# Patient Record
Sex: FEMALE | Race: WHITE | Employment: OTHER | URBAN - METROPOLITAN AREA
[De-identification: names, ages, dates, MRNs, and addresses within clinical notes are randomized per-mention and may not be internally consistent; named-entity substitution may affect disease eponyms.]

---

## 2018-01-15 PROBLEM — M75.41 IMPINGEMENT SYNDROME OF RIGHT SHOULDER: Status: ACTIVE | Noted: 2018-01-15

## 2021-10-28 ENCOUNTER — HOSPITAL ENCOUNTER (EMERGENCY)
Age: 71
Discharge: HOME | End: 2021-10-28
Attending: EMERGENCY MEDICINE
Payer: MEDICARE

## 2021-10-28 ENCOUNTER — APPOINTMENT (OUTPATIENT)
Dept: GENERAL RADIOLOGY | Age: 71
End: 2021-10-28
Payer: MEDICARE

## 2021-10-28 ENCOUNTER — APPOINTMENT (OUTPATIENT)
Dept: CT IMAGING | Age: 71
End: 2021-10-28
Payer: MEDICARE

## 2021-10-28 VITALS
DIASTOLIC BLOOD PRESSURE: 88 MMHG | TEMPERATURE: 97 F | HEART RATE: 89 BPM | BODY MASS INDEX: 23.55 KG/M2 | RESPIRATION RATE: 14 BRPM | HEIGHT: 62 IN | SYSTOLIC BLOOD PRESSURE: 183 MMHG | WEIGHT: 128 LBS | OXYGEN SATURATION: 98 %
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
## 2021-10-28 DIAGNOSIS — S43.421A SPRAIN OF RIGHT ROTATOR CUFF CAPSULE, INITIAL ENCOUNTER: ICD-10-CM

## 2021-10-28 DIAGNOSIS — S80.11XA CONTUSION OF RIGHT LOWER LEG, INITIAL ENCOUNTER: ICD-10-CM

## 2021-10-28 DIAGNOSIS — S43.101A AC SEPARATION, RIGHT, INITIAL ENCOUNTER: ICD-10-CM

## 2021-10-28 DIAGNOSIS — S93.401A SPRAIN OF RIGHT ANKLE, UNSPECIFIED LIGAMENT, INITIAL ENCOUNTER: ICD-10-CM

## 2021-10-28 LAB
EKG ATRIAL RATE: 80 BPM
EKG P AXIS: 74 DEGREES
EKG P-R INTERVAL: 138 MS
EKG Q-T INTERVAL: 384 MS
EKG QRS DURATION: 84 MS
EKG QTC CALCULATION (BAZETT): 442 MS
EKG R AXIS: -22 DEGREES
EKG T AXIS: 66 DEGREES
EKG VENTRICULAR RATE: 80 BPM

## 2021-10-28 PROCEDURE — 72125 CT NECK SPINE W/O DYE: CPT

## 2021-10-28 PROCEDURE — 73590 X-RAY EXAM OF LOWER LEG: CPT

## 2021-10-28 PROCEDURE — 73030 X-RAY EXAM OF SHOULDER: CPT

## 2021-10-28 PROCEDURE — 6370000000 HC RX 637 (ALT 250 FOR IP): Performed by: EMERGENCY MEDICINE

## 2021-10-28 PROCEDURE — 70450 CT HEAD/BRAIN W/O DYE: CPT

## 2021-10-28 PROCEDURE — 99282 EMERGENCY DEPT VISIT SF MDM: CPT

## 2021-10-28 PROCEDURE — 93010 ELECTROCARDIOGRAM REPORT: CPT | Performed by: INTERNAL MEDICINE

## 2021-10-28 PROCEDURE — 71046 X-RAY EXAM CHEST 2 VIEWS: CPT

## 2021-10-28 PROCEDURE — 93005 ELECTROCARDIOGRAM TRACING: CPT | Performed by: PHYSICIAN ASSISTANT

## 2021-10-28 RX ORDER — OXYCODONE HYDROCHLORIDE AND ACETAMINOPHEN 5; 325 MG/1; MG/1
1 TABLET ORAL ONCE
Status: COMPLETED | OUTPATIENT
Start: 2021-10-28 | End: 2021-10-28

## 2021-10-28 RX ORDER — OXYCODONE HYDROCHLORIDE AND ACETAMINOPHEN 5; 325 MG/1; MG/1
1 TABLET ORAL EVERY 6 HOURS PRN
Qty: 12 TABLET | Refills: 0 | Status: SHIPPED | OUTPATIENT
Start: 2021-10-28 | End: 2021-10-31

## 2021-10-28 RX ADMIN — OXYCODONE AND ACETAMINOPHEN 1 TABLET: 5; 325 TABLET ORAL at 18:03

## 2021-10-28 ASSESSMENT — ENCOUNTER SYMPTOMS
COUGH: 0
BLOOD IN STOOL: 0
ABDOMINAL PAIN: 0
BACK PAIN: 0
TROUBLE SWALLOWING: 0
RHINORRHEA: 0
NAUSEA: 0
VOMITING: 0
CHEST TIGHTNESS: 0
DIARRHEA: 0
WHEEZING: 0
SHORTNESS OF BREATH: 0
EYE PAIN: 0

## 2021-10-28 ASSESSMENT — PAIN SCALES - GENERAL
PAINLEVEL_OUTOF10: 8
PAINLEVEL_OUTOF10: 8
PAINLEVEL_OUTOF10: 2

## 2021-10-28 ASSESSMENT — PAIN DESCRIPTION - ORIENTATION: ORIENTATION: RIGHT

## 2021-10-28 ASSESSMENT — PAIN DESCRIPTION - PAIN TYPE: TYPE: ACUTE PAIN

## 2021-10-28 ASSESSMENT — PAIN DESCRIPTION - LOCATION: LOCATION: SHOULDER;LEG

## 2021-10-28 NOTE — ED NOTES
Bed: 24  Expected date:   Expected time:   Means of arrival:   Comments:  triage     Iman Arriaga RN  10/28/21 4865

## 2021-10-28 NOTE — ED NOTES
FIRST PROVIDER CONTACT ASSESSMENT NOTE      Department of Emergency Medicine   10/28/21  10:55 AM EDT    Chief Complaint: Dizziness (became dizzy feel onto rt  shoulder and rt leg. Denies head injury )      History of Present Illness:   Meme Huerta is a 80 y.o. female who presents to the ED for dizziness and right shoulder and right leg pain. States that yesterday she was walking when she got dizzy and fell on her right shoulder. States that she had a cabinet. Denies hitting her head, loss of consciousness, or blood thinner use. States that she is having some right shoulder pain, neck pain, and right lower leg pain. Denies chest pain, shortness of breath, vision changes, weakness, or other focal neuro symptoms. Medical History:  has a past medical history of Fatigue and Fibromyalgia. Surgical History:  has no past surgical history on file. Social History:  reports that she has never smoked. She has never used smokeless tobacco. She reports current alcohol use of about 1.0 standard drinks of alcohol per week. She reports that she does not use drugs. Family History: family history is not on file. *ALLERGIES*     Patient has no known allergies. Physical Exam:      VS:  BP (!) 183/88   Pulse 89   Temp 97 °F (36.1 °C) (Temporal)   Resp 14   Ht 5' 2\" (1.575 m)   Wt 128 lb (58.1 kg)   LMP  (LMP Unknown)   SpO2 98%   BMI 23.41 kg/m²      Initial Plan of Care:  Initiate Treatment-Testing, Proceed toTreatment Area When Bed Available for ED Attending/MLP to Continue Care    Radial pulse 2+  DP pulse 2+  Tenderness to palpation to the right shoulder and right distal tib-fib  No ankle or foot pain to the right side  No chest TTP  A&O x 3  No focal neuro sxs    Patient told to let us know for any new or worsening symptoms while she waits in the waiting room.   She is with her .    -----------------END OF FIRST PROVIDER CONTACT ASSESSMENT NOTE--------------  Electronically signed by Carlita Sparks FOZIA BRAUN   DD: 10/28/21             Doron Abdul PA-C  10/28/21 1056

## 2021-10-28 NOTE — ED PROVIDER NOTES
[facility]  eMERGENCY dEPARTMENT eNCOUnter      Pt Name: Lata Blood  MRN: [id number]  [place] 1950  Date of evaluation: 10/28/2021  Provider: Carmelo Bose MD     [address] [place] [address]       Chief Complaint   Patient presents with    Dizziness     became dizzy feel onto rt  shoulder and rt leg. Denies head injury          HISTORY OF PRESENT ILLNESS   (Location/Symptom, Timing/Onset,Context/Setting, Quality, Duration, Modifying Factors, Severity) Note limiting factors. Patient presents here with pain to her right shoulder and ankle. She has been having problems with her sinuses which causes intermittent dizziness. Patient states that she was walking down into the basement to get something and when she turned she lost her balance and fell injuring her right shoulder and hitting her leg against the side of a door. It was hurting her all night she was really not getting any better so was brought here into the emergency department. She denies any other complaints. No headache. She only only complains of pain in her right tibia and in her right shoulder. She really did not get dizzy when she fell but she lost her balance when she turned. She has not taken any medication. Lata Blood is a 80 y.o. female who presents to the emergency department      Nursing Notes were reviewed. REVIEW OF SYSTEMS    (2+ for4; 10+ for level 5)     Review of Systems   Constitutional: Negative for appetite change, chills, fatigue, fever and unexpected weight change. HENT: Negative for congestion, drooling, nosebleeds, rhinorrhea and trouble swallowing. Eyes: Negative for pain and visual disturbance. Respiratory: Negative for cough, chest tightness, shortness of breath and wheezing. Cardiovascular: Negative for chest pain, palpitations and leg swelling.    Gastrointestinal: Negative for abdominal pain, blood in stool, diarrhea, nausea and vomiting. Endocrine: Negative for polydipsia and polyuria. Genitourinary: Negative for difficulty urinating, dysuria, flank pain, frequency, hematuria and urgency. Musculoskeletal: Positive for arthralgias. Negative for back pain, myalgias and neck pain. Skin: Negative for pallor and rash. Allergic/Immunologic: Negative for environmental allergies. Neurological: Negative for dizziness, syncope, speech difficulty, weakness, light-headedness, numbness and headaches. Hematological: Does not bruise/bleed easily. Psychiatric/Behavioral: Negative for confusion and decreased concentration. All other systems reviewed and are negative. PAST MEDICAL HISTORY     Past Medical History:   Diagnosis Date    Fatigue     Fibromyalgia        SURGICALHISTORY     No past surgical history on file. CURRENT MEDICATIONS       Previous Medications    AMMONIUM LACTATE (LAC-HYDRIN) 12 % LOTION    Apply topically daily. DULOXETINE (CYMBALTA) 60 MG EXTENDED RELEASE CAPSULE    Take 1 capsule by mouth 2 times daily    HANDICAP PLACARD MISC    by Does not apply route Duration 5 years    HYDROCORTISONE 2.5 % CREAM    APPLY TOPICALLY TWICE DAILY    MONTELUKAST (SINGULAIR) 10 MG TABLET    Take 1 tablet by mouth daily            Patient has no known allergies. FAMILY HISTORY     No family history on file. SOCIAL HISTORY       Social History     Socioeconomic History    Marital status:      Spouse name: Not on file    Number of children: Not on file    Years of education: Not on file    Highest education level: Not on file   Occupational History    Not on file   Tobacco Use    Smoking status: Never Smoker    Smokeless tobacco: Never Used   Substance and Sexual Activity    Alcohol use:  Yes     Alcohol/week: 1.0 standard drinks     Types: 1 Glasses of wine per week     Comment: .5 galss wine daily    Drug use: No    Sexual activity: Not on file   Other Topics Concern    Not on file   Social History Narrative    Not on file     Social Determinants of Health     Financial Resource Strain:     Difficulty of Paying Living Expenses:    Food Insecurity:     Worried About Running Out of Food in the Last Year:     [address] in the Last Year:    Transportation Needs:     Lack of Transportation (Medical):  Lack of Transportation (Non-Medical):    Physical Activity:     Days of Exercise per Week:     Minutes of Exercise per Session:    Stress:     Feeling of Stress :    Social Connections:     Frequency of Communication with Friends and Family:     Frequency of Social Gatherings with Friends and Family:     Attends Temple Services:     Active Member of Clubs or Organizations:     Attends Club or Organization Meetings:     Marital Status:    Intimate Partner Violence:     Fear of Current or Ex-Partner:     Emotionally Abused:     Physically Abused:     Sexually Abused:        SCREENINGS      @FLOW([id number])@    PHYSICAL EXAM    (5+ for level 4, 8+ for level 5)     ED Triage Vitals [10/28/21 1037]   BP Temp Temp Source Pulse Resp SpO2 Height Weight   (!) 183/88 97 °F (36.1 °C) Temporal 89 14 98 % 5' 2\" (1.575 m) 128 lb (58.1 kg)       Physical Exam  Vitals and nursing note reviewed. Constitutional:       General: She is not in acute distress. Appearance: Normal appearance. She is well-developed. She is not ill-appearing, toxic-appearing or diaphoretic. HENT:      Head: Normocephalic and atraumatic. Nose: Nose normal. No congestion or rhinorrhea. Eyes:      General: No scleral icterus. Right eye: No discharge. Left eye: No discharge. Conjunctiva/sclera: Conjunctivae normal.      Pupils: Pupils are equal, round, and reactive to light. Neck:      Thyroid: No thyromegaly. Vascular: No JVD. Trachea: No tracheal deviation.       Comments: Patient was placed in a c-collar on arrival.  After her CT was obtained c-collar was removed and she was put through range of motion with no pain. Cardiovascular:      Rate and Rhythm: Normal rate and regular rhythm. Heart sounds: Normal heart sounds. No murmur heard. No gallop. Pulmonary:      Effort: Pulmonary effort is normal. No respiratory distress. Breath sounds: Normal breath sounds. No stridor. No wheezing or rales. Chest:      Chest wall: No tenderness. Abdominal:      General: Abdomen is flat. There is no distension. Palpations: Abdomen is soft. There is no mass. Tenderness: There is no abdominal tenderness. There is no guarding or rebound. Musculoskeletal:         General: Tenderness and signs of injury present. No deformity. Cervical back: Normal range of motion and neck supple. No rigidity or tenderness. Comments: Patient is awake and alert. She is in a c-collar. Her neck is supple and now nontender. She has pain to palpation over the right [facility] [place] joint there is some swelling. She has decreased range of motion of the shoulder but there is no swelling or deformity. She has a hematoma to the right anterior tibia. She also has some mild swelling to the right lateral malleolus. No discrete bony tenderness. No medial tenderness. There is no deformities or signs of obvious fracture. The remainder of her extremities have good range of motion   Skin:     General: Skin is warm and dry. Coloration: Skin is not pale. Findings: No erythema or rash. Neurological:      General: No focal deficit present. Mental Status: She is alert and oriented to person, place, and time. Cranial Nerves: No cranial nerve deficit. Sensory: No sensory deficit. Motor: No abnormal muscle tone. Coordination: Coordination normal.   Psychiatric:         Behavior: Behavior normal.         Thought Content: Thought content normal.         Judgment: Judgment normal.         DIAGNOSTIC RESULTS     EKG (Per Emergency Physician):   Normal sinus rhythm. Rate of 80. Normal NH. Normal QRS. Normal ST segments. No acute ischemia    RADIOLOGY (Per Benito Barlow): Interpretation per the Radiologist below, if available at the time of this note:  XR CHEST (2 VW)    Result Date: 10/28/2021  EXAMINATION: TWO XRAY VIEWS OF THE CHEST 10/28/2021 11:44 am COMPARISON: None. HISTORY: ORDERING SYSTEM PROVIDED HISTORY: cardiac work up TECHNOLOGIST PROVIDED HISTORY: Reason for exam:->cardiac work up FINDINGS: PA and lateral views of the chest were obtained. Heart, mediastinum, and pulmonary vasculature are within normal limits. Lungs and pleural spaces are clear. There are degenerative changes within the spine. No active cardiopulmonary disease. XR SHOULDER RIGHT (MIN 2 VIEWS)    Result Date: 10/28/2021  EXAMINATION: THREE XRAY VIEWS OF THE RIGHT SHOULDER 10/28/2021 11:44 am COMPARISON: None. HISTORY: ORDERING SYSTEM PROVIDED HISTORY: fall; pain TECHNOLOGIST PROVIDED HISTORY: Reason for exam:->fall; pain FINDINGS: AP, Grashey, and scapular Y-views of the right shoulder were obtained. There is no acute fracture or dislocation. There is degenerative change at the acromioclavicular joint with inferiorly oriented osteophytes. There is minimal elevation of the distal right clavicle in relation to the acromion. There is no abnormal widening of the acromioclavicular joint. There is suggestion of soft tissue swelling overlying the acromioclavicular joint. 1. No acute fracture or dislocation. 2. Degenerative change at the acromioclavicular joint. 3. Minimal elevation of the distal right clavicle in relation to the acromion could represent type III injury of indeterminate age. XR TIBIA FIBULA RIGHT (2 VIEWS)    Result Date: 10/28/2021  EXAMINATION: 2 XRAY VIEWS OF THE RIGHT TIBIA AND FIBULA 10/28/2021 11:44 am COMPARISON: None.  HISTORY: ORDERING SYSTEM PROVIDED HISTORY: fall; distal pain; get R ankle TECHNOLOGIST PROVIDED HISTORY: Reason for exam:->fall; distal pain; get tibia and chest.  It was noted that she probably has an AC injury on the right. All of these findings were thoroughly discussed with the patient and her  and they voiced understanding and have no further questions. She will be placed in an Aircast and given a sling. He will be given 1 Percocet here. She will be discharged with Voltaren gel and Percocet for home. They are cautioned to be careful after taking the Percocet that she does not move too quickly and that it may make her dizzy or tired. They do voice understanding. She is also advised should her arm still bother her in a week that she may need a MRI.   have spoken with the patient and discussed today's results, in addition to providing specific details for the plan of care and counseling regarding the diagnosis and prognosis. Their questions are answered at this time and they are agreeable with the plan. At this time the patient is without objective evidence of an acute process requiring hospitalization or inpatient management. They have remained hemodynamically stable throughout their entire ED visit and are stable for discharge with outpatient follow-up  . CONSULTS:  None    PROCEDURES:  Unless otherwise noted below, none     Procedures    FINAL IMPRESSION      1. Fall, initial encounter    2. AC separation, right, initial encounter    3. Sprain of right ankle, unspecified ligament, initial encounter    4. Contusion of right lower leg, initial encounter    5. Sprain of right rotator cuff capsule, initial encounter        DISPOSITION/PLAN   DISPOSITION        PATIENT REFERRED TO:  No follow-up provider specified. DISCHARGE MEDICATIONS:  New Prescriptions    DICLOFENAC SODIUM (VOLTAREN) 1 % GEL    Apply 4 g topically 4 times daily    OXYCODONE-ACETAMINOPHEN (PERCOCET) 5-325 MG PER TABLET    Take 1 tablet by mouth every 6 hours as needed for Pain for up to 3 days. Intended supply: 3 days.  Take lowest dose possible to manage pain (Please note:  Portions of this note were completed with a voice recognition program.  Efforts were made to edit thedictations but occasionally words and phrases are mis-transcribed.)    Form v2016. J.5-cn    Ashly Skelton MD (electronically signed)  Emergency Medicine Provider         Ashly Skelton MD  10/28/21 5525 [facility] Center[facility] Cohen MD  10/28/21 2082

## 2022-03-02 ENCOUNTER — OFFICE VISIT (OUTPATIENT)
Dept: ORTHOPEDIC SURGERY | Age: 72
End: 2022-03-02

## 2022-03-02 VITALS — HEIGHT: 65 IN | BODY MASS INDEX: 21.49 KG/M2 | WEIGHT: 129 LBS

## 2022-03-02 DIAGNOSIS — M19.011 ARTHRITIS OF RIGHT ACROMIOCLAVICULAR JOINT: Primary | ICD-10-CM

## 2022-03-02 DIAGNOSIS — M75.41 IMPINGEMENT SYNDROME OF RIGHT SHOULDER: ICD-10-CM

## 2022-03-02 PROCEDURE — 99203 OFFICE O/P NEW LOW 30 MIN: CPT | Performed by: ORTHOPAEDIC SURGERY

## 2022-03-02 RX ORDER — VITAMIN B COMPLEX
1 CAPSULE ORAL DAILY
COMMUNITY

## 2022-03-02 NOTE — PROGRESS NOTES
Chief Complaint:   Chief Complaint   Patient presents with    Shoulder Pain     Bilateral shoulder and left arm pain. L > R. Patient states she fell 10/28/21 on right shoulder and xray showed AC separation. HPI 21-year-old woman here for evaluation of right shoulder pain. She states this is related to falling onto her outstretched right arm last October. She was seen by me for right shoulder pain also in 2018 and had poor response to steroid injection and to physical therapy. Patient states she has fibromyalgia. She also has a complaint of left anterior elbow pain after a blood draw about a month ago said she had swelling afterwards. She had right shoulder x-rays after she fell and  states she was told the \"bone was out of place\". Patient Active Problem List   Diagnosis    Fibromyalgia    Fatigue    Depression    Impingement syndrome of right shoulder       Past Medical History:   Diagnosis Date    Fatigue     Fibromyalgia        History reviewed. No pertinent surgical history. Current Outpatient Medications   Medication Sig Dispense Refill    COD LIVER OIL PO Take by mouth daily      b complex vitamins capsule Take 1 capsule by mouth daily      DULoxetine (CYMBALTA) 60 MG extended release capsule Take 1 capsule by mouth 2 times daily 90 capsule 2    diclofenac sodium (VOLTAREN) 1 % GEL Apply 4 g topically 4 times daily (Patient not taking: Reported on 3/2/2022) 100 g 0    montelukast (SINGULAIR) 10 MG tablet Take 1 tablet by mouth daily (Patient not taking: Reported on 3/2/2022) 90 tablet 3    ammonium lactate (LAC-HYDRIN) 12 % lotion Apply topically daily.  (Patient not taking: Reported on 3/2/2022) 1 Bottle 2    hydrocortisone 2.5 % cream APPLY TOPICALLY TWICE DAILY (Patient not taking: Reported on 3/2/2022) 45 g 1    Handicap Placard MISC by Does not apply route Duration 5 years (Patient not taking: Reported on 3/2/2022) 1 each 0     No current facility-administered medications for this visit. No Known Allergies    Social History     Socioeconomic History    Marital status:      Spouse name: None    Number of children: None    Years of education: None    Highest education level: None   Occupational History    None   Tobacco Use    Smoking status: Never Smoker    Smokeless tobacco: Never Used   Substance and Sexual Activity    Alcohol use: Yes     Alcohol/week: 1.0 standard drink     Types: 1 Glasses of wine per week     Comment: .5 galss wine daily    Drug use: No    Sexual activity: None   Other Topics Concern    None   Social History Narrative    None     Social Determinants of Health     Financial Resource Strain:     Difficulty of Paying Living Expenses: Not on file   Food Insecurity:     Worried About Running Out of Food in the Last Year: Not on file    Huan of Food in the Last Year: Not on file   Transportation Needs:     Lack of Transportation (Medical): Not on file    Lack of Transportation (Non-Medical): Not on file   Physical Activity:     Days of Exercise per Week: Not on file    Minutes of Exercise per Session: Not on file   Stress:     Feeling of Stress : Not on file   Social Connections:     Frequency of Communication with Friends and Family: Not on file    Frequency of Social Gatherings with Friends and Family: Not on file    Attends Jewish Services: Not on file    Active Member of [address] [place] [address] or Organizations: Not on file    Attends Club or Organization Meetings: Not on file    Marital Status: Not on file   Intimate Partner Violence:     Fear of Current or Ex-Partner: Not on file    Emotionally Abused: Not on file    Physically Abused: Not on file    Sexually Abused: Not on file   Housing Stability:     Unable to Pay for Housing in the Last Year: Not on file    Number of [place] in the Last Year: Not on file    Unstable Housing in the Last Year: Not on file       History reviewed. No pertinent family history. Review of Systems   No fever, chills, or other constitutionalsymptoms. No numbness or other neuro symptoms. No chest pain. No dyspnea. [unfilled]   No acute distress. Right shoulder exam demonstrates no swelling or deformity. Tender diffusely soft tissues over the anterior dorsal right shoulder including at the [facility] joint but no significant palpable bony prominence. She is able to flex and abduct greater than 90 degrees and internally rotate to the posterior hip with effort limited by comfort. Gross abduction strength is intact. Left arm exam demonstrates tenderness and hypersensitivity over the antecubital fossa and distal biceps tendon but no swelling mass or erythema. Full elbow range of motion with some guarding. Physical Exam    Patient is alert and oriented. Well-developed well-nourished. Pupils equal and reactive. Scleraeanicteric. Neck supple  Lungs clear. Cardiac rate and rhythm regular. Abdomen soft and nontender. Skin warm and dry. XRAY: AP lateral x-ray views right shoulder repeated today and compared with her prior x-rays. Proximal humerus and glenohumeral joint are intact as is the subacromial space. There are degenerative changes at the [facility] joint as well as some periosteal reaction at the lateral clavicle which may represent healing reaction or osteophytic formation due to degenerative change. All bony alignment is satisfactory. Impression: Degenerative changes right shoulder AC joint question possible periosteal reaction consistent with healed nondisplaced fracture. ASSESSMENT/PLAN:    Alberto Edwards was seen today for shoulder pain. Diagnoses and all orders for this visit:    Arthritis of right acromioclavicular joint    Impingement syndrome of right shoulder  -     XR SHOULDER RIGHT (MIN 2 VIEWS); Future    Findings and images reviewed with the patient and her . No surgical intervention. She declines any needles.   Recommend heat and light exercises both for the right shoulder and for the soft tissue pain antecubital left elbow. She asked about fibromyalgia referral and suggest she take this up with primary care. Return if symptoms worsen or fail to improve.        Kalee Warner MD    3/2/2022  3:53 PM

## 2024-01-25 ENCOUNTER — OFFICE VISIT (OUTPATIENT)
Dept: ORTHOPEDIC SURGERY | Age: 74
End: 2024-01-25
Payer: MEDICARE

## 2024-01-25 DIAGNOSIS — M25.561 RIGHT KNEE PAIN, UNSPECIFIED CHRONICITY: Primary | ICD-10-CM

## 2024-01-25 PROCEDURE — 99203 OFFICE O/P NEW LOW 30 MIN: CPT | Performed by: STUDENT IN AN ORGANIZED HEALTH CARE EDUCATION/TRAINING PROGRAM

## 2024-01-25 PROCEDURE — 1123F ACP DISCUSS/DSCN MKR DOCD: CPT | Performed by: STUDENT IN AN ORGANIZED HEALTH CARE EDUCATION/TRAINING PROGRAM

## 2024-01-25 RX ORDER — MELOXICAM 7.5 MG/1
7.5 TABLET ORAL DAILY
Qty: 30 TABLET | Refills: 0 | Status: SHIPPED | OUTPATIENT
Start: 2024-01-25

## 2024-01-25 NOTE — PROGRESS NOTES
file   Social Connections: Not on file   Intimate Partner Violence: Not on file   Housing Stability: Not on file     Social History     Occupational History    Not on file   Tobacco Use    Smoking status: Never    Smokeless tobacco: Never   Substance and Sexual Activity    Alcohol use: Yes     Alcohol/week: 1.0 standard drink of alcohol     Types: 1 Glasses of wine per week     Comment: .5 galss wine daily    Drug use: No    Sexual activity: Not on file       CURRENT MEDICATIONS     Current Outpatient Medications:     meloxicam (MOBIC) 7.5 MG tablet, Take 1 tablet by mouth daily, Disp: 30 tablet, Rfl: 0    COD LIVER OIL PO, Take by mouth daily, Disp: , Rfl:     b complex vitamins capsule, Take 1 capsule by mouth daily, Disp: , Rfl:     DULoxetine (CYMBALTA) 60 MG extended release capsule, Take 1 capsule by mouth 2 times daily, Disp: 90 capsule, Rfl: 2    montelukast (SINGULAIR) 10 MG tablet, Take 1 tablet by mouth daily, Disp: 90 tablet, Rfl: 3    diclofenac sodium (VOLTAREN) 1 % GEL, Apply 4 g topically 4 times daily (Patient not taking: Reported on 3/2/2022), Disp: 100 g, Rfl: 0    ammonium lactate (LAC-HYDRIN) 12 % lotion, Apply topically daily. (Patient not taking: Reported on 3/2/2022), Disp: 1 Bottle, Rfl: 2    hydrocortisone 2.5 % cream, APPLY TOPICALLY TWICE DAILY (Patient not taking: Reported on 3/2/2022), Disp: 45 g, Rfl: 1    Handicap Placard MISC, by Does not apply route Duration 5 years (Patient not taking: Reported on 3/2/2022), Disp: 1 each, Rfl: 0    ALLERGIES  No Known Allergies    Controlled Substances Monitoring:          REVIEW OF SYSTEMS:     Constitutional:  Negative for weight loss, fevers, chills, fatigue  Cardiovascular: Negative for chest pain, palpitations  Pulmonary: Negative for shortness of breath, labored breathing, cough  GI: negative for abdominal pain, nausea, vomitting   MSK: per HPI  Skin: negative for rash, open wounds    All other systems reviewed and are negative

## 2024-02-03 ENCOUNTER — HOSPITAL ENCOUNTER (OUTPATIENT)
Dept: MRI IMAGING | Age: 74
End: 2024-02-03
Attending: STUDENT IN AN ORGANIZED HEALTH CARE EDUCATION/TRAINING PROGRAM
Payer: MEDICARE

## 2024-02-03 DIAGNOSIS — M25.561 RIGHT KNEE PAIN, UNSPECIFIED CHRONICITY: ICD-10-CM

## 2024-02-03 PROCEDURE — 73721 MRI JNT OF LWR EXTRE W/O DYE: CPT

## 2024-02-08 ENCOUNTER — OFFICE VISIT (OUTPATIENT)
Dept: ORTHOPEDIC SURGERY | Age: 74
End: 2024-02-08

## 2024-02-08 DIAGNOSIS — M25.561 RIGHT KNEE PAIN, UNSPECIFIED CHRONICITY: Primary | ICD-10-CM

## 2024-02-08 RX ORDER — TRIAMCINOLONE ACETONIDE 40 MG/ML
80 INJECTION, SUSPENSION INTRA-ARTICULAR; INTRAMUSCULAR ONCE
Status: COMPLETED | OUTPATIENT
Start: 2024-02-08 | End: 2024-02-08

## 2024-02-08 RX ORDER — BUPIVACAINE HYDROCHLORIDE 2.5 MG/ML
3 INJECTION, SOLUTION INFILTRATION; PERINEURAL ONCE
Status: COMPLETED | OUTPATIENT
Start: 2024-02-08 | End: 2024-02-08

## 2024-02-08 RX ADMIN — BUPIVACAINE HYDROCHLORIDE 7.5 MG: 2.5 INJECTION, SOLUTION INFILTRATION; PERINEURAL at 13:47

## 2024-02-08 RX ADMIN — TRIAMCINOLONE ACETONIDE 80 MG: 40 INJECTION, SUSPENSION INTRA-ARTICULAR; INTRAMUSCULAR at 13:47

## 2024-02-08 NOTE — PROGRESS NOTES
CHIEF COMPLAINT:   Chief Complaint   Patient presents with    Follow-up     MRI right knee 2/5/24. The knee is still very painful      HPI update 2/8/2024: She is here today to follow-up for right knee MRI.  She is still having significant pain and ambulatory dysfunction.  She thinks her fibromyalgia is acting up due to this knee injury.  She is having significant pain in her knee with ambulation mostly on the lateral side now and posterior.      HPI:    Boyd Sheehan is a 73 y.o. year old female presents with right knee pain that started last week. She states she twisted in the bathroom and felt a \"pop and crack\" with immediate pain. She has had constant pain since this occurred and has tried using Voltaren gel with no relief. She states the pain worsens as the day goes on and has increased swelling in the afternoon. She does have fibromyalgia which she believes is causing worsening pain throughout her thigh.  She does endorse mechanical symptoms in the medial side and knee pain    PAST MEDICAL HISTORY  Past Medical History:   Diagnosis Date    Fatigue     Fibromyalgia        PAST SURGICAL HISTORY  History reviewed. No pertinent surgical history.      FAMILY HISTORY   History reviewed. No pertinent family history.    SOCIAL HISTORY  Social History     Socioeconomic History    Marital status:      Spouse name: Not on file    Number of children: Not on file    Years of education: Not on file    Highest education level: Not on file   Occupational History    Not on file   Tobacco Use    Smoking status: Never    Smokeless tobacco: Never   Substance and Sexual Activity    Alcohol use: Yes     Alcohol/week: 1.0 standard drink of alcohol     Types: 1 Glasses of wine per week     Comment: .5 galss wine daily    Drug use: No    Sexual activity: Not on file   Other Topics Concern    Not on file   Social History Narrative    Not on file     Social Determinants of Health     Financial Resource Strain: Not on file

## 2024-02-20 ENCOUNTER — TELEPHONE (OUTPATIENT)
Dept: ORTHOPEDIC SURGERY | Age: 74
End: 2024-02-20

## 2024-02-20 NOTE — TELEPHONE ENCOUNTER
2/8/24 had Right knee CSI after MRI    MRI reviewed in detail with the patient.  Here the findings belowIMPRESSION:  1. Mucoid degeneration and degenerative tearing of the anterior horn and  anterior horn/body segment junction of the lateral meniscus.  2. Mild degenerative changes greatest in the patellofemoral joint compartment.  3. Moderate joint effusion.    Called today, states the injection has not helped with the pain. Still does not want to have surgery.    Would like to know if bracing is an option to help with the pain??

## 2024-02-22 ENCOUNTER — OFFICE VISIT (OUTPATIENT)
Dept: ORTHOPEDIC SURGERY | Age: 74
End: 2024-02-22
Payer: MEDICARE

## 2024-02-22 DIAGNOSIS — M79.604 RIGHT LEG PAIN: Primary | ICD-10-CM

## 2024-02-22 PROCEDURE — 1123F ACP DISCUSS/DSCN MKR DOCD: CPT | Performed by: STUDENT IN AN ORGANIZED HEALTH CARE EDUCATION/TRAINING PROGRAM

## 2024-02-22 PROCEDURE — 99213 OFFICE O/P EST LOW 20 MIN: CPT | Performed by: STUDENT IN AN ORGANIZED HEALTH CARE EDUCATION/TRAINING PROGRAM

## 2024-02-22 NOTE — PROGRESS NOTES
CHIEF COMPLAINT:   Chief Complaint   Patient presents with    Knee Pain     Still having right knee pain. Hoping maybe a brace can help        HPI update 2/22/2024: She is here today for follow-up on her right knee pain.  She is still having pain.  She is hoping that she can try a brace.  She has had no injury since her last visit.  Apparently the steroid injection did not provide any relief    HPI update 2/8/2024: She is here today to follow-up for right knee MRI.  She is still having significant pain and ambulatory dysfunction.  She thinks her fibromyalgia is acting up due to this knee injury.  She is having significant pain in her knee with ambulation mostly on the lateral side now and posterior.      HPI:    Boyd Sheehan is a 73 y.o. year old female presents with right knee pain that started last week. She states she twisted in the bathroom and felt a \"pop and crack\" with immediate pain. She has had constant pain since this occurred and has tried using Voltaren gel with no relief. She states the pain worsens as the day goes on and has increased swelling in the afternoon. She does have fibromyalgia which she believes is causing worsening pain throughout her thigh.  She does endorse mechanical symptoms in the medial side and knee pain    PAST MEDICAL HISTORY  Past Medical History:   Diagnosis Date    Fatigue     Fibromyalgia        PAST SURGICAL HISTORY  History reviewed. No pertinent surgical history.      FAMILY HISTORY   History reviewed. No pertinent family history.    SOCIAL HISTORY  Social History     Socioeconomic History    Marital status:      Spouse name: Not on file    Number of children: Not on file    Years of education: Not on file    Highest education level: Not on file   Occupational History    Not on file   Tobacco Use    Smoking status: Never    Smokeless tobacco: Never   Substance and Sexual Activity    Alcohol use: Yes     Alcohol/week: 1.0 standard drink of alcohol     Types: 1

## 2024-02-29 ENCOUNTER — OFFICE VISIT (OUTPATIENT)
Dept: PHYSICAL MEDICINE AND REHAB | Age: 74
End: 2024-02-29
Payer: MEDICARE

## 2024-02-29 VITALS
DIASTOLIC BLOOD PRESSURE: 75 MMHG | HEART RATE: 83 BPM | WEIGHT: 129 LBS | HEIGHT: 66 IN | SYSTOLIC BLOOD PRESSURE: 152 MMHG | BODY MASS INDEX: 20.73 KG/M2

## 2024-02-29 DIAGNOSIS — M79.7 FIBROMYALGIA: Primary | ICD-10-CM

## 2024-02-29 DIAGNOSIS — M54.16 LUMBAR RADICULITIS: ICD-10-CM

## 2024-02-29 DIAGNOSIS — M54.2 CERVICALGIA: ICD-10-CM

## 2024-02-29 DIAGNOSIS — M25.561 RIGHT KNEE PAIN, UNSPECIFIED CHRONICITY: ICD-10-CM

## 2024-02-29 PROCEDURE — 1123F ACP DISCUSS/DSCN MKR DOCD: CPT | Performed by: STUDENT IN AN ORGANIZED HEALTH CARE EDUCATION/TRAINING PROGRAM

## 2024-02-29 PROCEDURE — 99204 OFFICE O/P NEW MOD 45 MIN: CPT | Performed by: STUDENT IN AN ORGANIZED HEALTH CARE EDUCATION/TRAINING PROGRAM

## 2024-02-29 RX ORDER — MELOXICAM 7.5 MG/1
7.5 TABLET ORAL DAILY PRN
Qty: 30 TABLET | Refills: 0 | Status: SHIPPED | OUTPATIENT
Start: 2024-02-29

## 2024-02-29 NOTE — PATIENT INSTRUCTIONS
- Xrays of neck and back today  - Referral placed for massage therapy - you have to call your insurance to see if this is approved and find someone close to where you live  - Referral placed to [facility] Therapy at [facility] ([address]) - they will call you to schedule  - Ice the knee for 20 minutes, two times per day - no heat to the knee  - OK for heat to the low back and the neck  - Refill for the meloxicam was written today  - Follow up in 8 weeks

## 2024-02-29 NOTE — PROGRESS NOTES
well. This has been present for years. Unsure if anything has been helpful. Has not done any therapy for this. No prior workup per the patient.     Fibromyalgia treatments - this is being managed by her PCP chronically and she has been offered second opinions from PCP for these symptoms per chart review, but she declined  On Cymbalta 60 mg QDAY  - has tried Lyrica, gabapentin, elavil, and multiple other medications that she and her  are not sure  - has not tried water therapy - feels like the jacuzzi makes her dizzy  - no recent formal physical therapy - states she works around the house a lot and is very active  - no other counseling/mental health therapy  - feels like sleep is improving, now sleeping later in the mornings.    [place] Report  Controlled Substance Monitoring:  Acute and Chronic Pain Monitoring:   No signs of misuse     Review of Systems:  All other systems negative or as above and below.    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 No pertinent surgical history.     Social History:  Social History     Socioeconomic History    Marital status:      Spouse name: None    Number of children: None    Years of education: None    Highest education level: None   Tobacco Use    Smoking status: Never    Smokeless tobacco: Never   Substance and Sexual Activity

## 2024-06-13 ENCOUNTER — TELEPHONE (OUTPATIENT)
Dept: ORTHOPEDIC SURGERY | Age: 74
End: 2024-06-13

## 2024-06-13 DIAGNOSIS — M79.7 FIBROMYALGIA: Primary | ICD-10-CM

## 2024-07-19 ENCOUNTER — TELEPHONE (OUTPATIENT)
Dept: ORTHOPEDIC SURGERY | Age: 74
End: 2024-07-19

## 2024-07-19 NOTE — TELEPHONE ENCOUNTER
Patient's  has called twice to schedule appt for wife to have repeat cortisone injection right knee    Have tried to call  back x 2 unable to reach him LM on VM.  On 7/18/2024 Offered wife appt with Olga GARZA for Monday 7/22/2024 she was going to think about it due to inability to schedule surgery for 3 months after injection Rt knee.

## 2025-01-02 ENCOUNTER — TELEPHONE (OUTPATIENT)
Dept: ORTHOPEDIC SURGERY | Age: 75
End: 2025-01-02

## 2025-01-02 NOTE — TELEPHONE ENCOUNTER
I would have her continue taking the mobic; if she is unsure if she took it today I would not take more, just wait until tomorrow to take next dose. Can also use tylenol and compression or knee brace. I can send over Voltaren gel or they can purchase OTC voltaren to see if that will help.

## 2025-01-02 NOTE — TELEPHONE ENCOUNTER
Patient has right knee pain with appt scheduled 1/16/25     calling, states she is having a lot of pain in the knee and requesting medication. He has given her a Tramadol which has helped. He does not remember if she took Meloxicam she was given by Dr Balbuena for Fibromyalgia

## 2025-01-16 ENCOUNTER — OFFICE VISIT (OUTPATIENT)
Dept: ORTHOPEDIC SURGERY | Age: 75
End: 2025-01-16

## 2025-01-16 VITALS
DIASTOLIC BLOOD PRESSURE: 78 MMHG | HEART RATE: 81 BPM | OXYGEN SATURATION: 99 % | SYSTOLIC BLOOD PRESSURE: 129 MMHG | TEMPERATURE: 98.1 F

## 2025-01-16 DIAGNOSIS — M17.11 PRIMARY OSTEOARTHRITIS OF RIGHT KNEE: Primary | ICD-10-CM

## 2025-01-16 RX ORDER — BUPIVACAINE HYDROCHLORIDE 2.5 MG/ML
3 INJECTION, SOLUTION INFILTRATION; PERINEURAL ONCE
Status: COMPLETED | OUTPATIENT
Start: 2025-01-16 | End: 2025-01-16

## 2025-01-16 RX ORDER — TRIAMCINOLONE ACETONIDE 40 MG/ML
80 INJECTION, SUSPENSION INTRA-ARTICULAR; INTRAMUSCULAR ONCE
Status: COMPLETED | OUTPATIENT
Start: 2025-01-16 | End: 2025-01-16

## 2025-01-16 RX ADMIN — BUPIVACAINE HYDROCHLORIDE 7.5 MG: 2.5 INJECTION, SOLUTION INFILTRATION; PERINEURAL at 11:12

## 2025-01-16 RX ADMIN — TRIAMCINOLONE ACETONIDE 80 MG: 40 INJECTION, SUSPENSION INTRA-ARTICULAR; INTRAMUSCULAR at 11:12

## 2025-01-16 NOTE — PROGRESS NOTES
The patient is a 68y Female complaining of Mild degenerative changes greatest in the patellofemoral joint compartment.  3. Moderate joint effusion.    Old images reviewed in detail    ASSESSMENT    Right knee osteoarthritis  Fibromyalgia    PLAN    -Repeat injection provided today.  1 week prescription for tramadol provided to help with her acute exacerbation of her osteoarthritis.  No refills after this.  If this injection fails provide lasting relief we will bring her back to repeat x-rays.  I suspect her arthritis has progressed and the next surgical option would be a total knee arthroplasty.      .jonathan Angulo DO   Orthopaedic Surgery   1/16/25  11:08 AM    Note: This report was completed using [employer] voiced recognition software.  Every effort has been made to ensure accuracy; however, inadvertent computerized transcription errors may be present.

## 2025-01-30 ENCOUNTER — TELEPHONE (OUTPATIENT)
Dept: ORTHOPEDIC SURGERY | Age: 75
End: 2025-01-30

## 2025-01-30 RX ORDER — MELOXICAM 15 MG/1
15 TABLET ORAL DAILY
Qty: 90 TABLET | Refills: 1 | Status: SHIPPED | OUTPATIENT
Start: 2025-01-30

## 2025-01-30 NOTE — TELEPHONE ENCOUNTER
Patients  called in and stated that the injection isn't helping his wife and they want something for the pain and inflammation please.

## 2025-08-05 ENCOUNTER — HOSPITAL ENCOUNTER (OUTPATIENT)
Age: 75
Discharge: HOME | End: 2025-08-07

## 2025-08-05 LAB
ABO + RH BLD: NORMAL
ARM BAND NUMBER: NORMAL
BLOOD BANK SAMPLE EXPIRATION: NORMAL
BLOOD GROUP ANTIBODIES SERPL: NEGATIVE
INR PPP: 1.1
PARTIAL THROMBOPLASTIN TIME: 33.3 SEC (ref 24.5–35.1)
PROTHROMBIN TIME: 11.5 SEC (ref 9.3–12.4)

## 2025-08-05 PROCEDURE — 85610 PROTHROMBIN TIME: CPT

## 2025-08-05 PROCEDURE — 86901 BLOOD TYPING SEROLOGIC RH(D): CPT

## 2025-08-05 PROCEDURE — 85730 THROMBOPLASTIN TIME PARTIAL: CPT

## 2025-08-05 PROCEDURE — 86900 BLOOD TYPING SEROLOGIC ABO: CPT

## 2025-08-05 PROCEDURE — 86850 RBC ANTIBODY SCREEN: CPT

## 2025-08-05 PROCEDURE — 87081 CULTURE SCREEN ONLY: CPT

## 2025-08-07 LAB
MICROORGANISM SPEC CULT: NORMAL
SPECIMEN DESCRIPTION: NORMAL

## 2025-08-13 ENCOUNTER — HOSPITAL ENCOUNTER (OUTPATIENT)
Age: 75
Discharge: HOME | End: 2025-08-15

## 2025-08-14 ENCOUNTER — HOSPITAL ENCOUNTER (OUTPATIENT)
Age: 75
Discharge: HOME | End: 2025-08-16

## 2025-08-14 LAB
ANION GAP SERPL CALCULATED.3IONS-SCNC: 14 MMOL/L (ref 7–16)
BUN SERPL-MCNC: 16 MG/DL (ref 8–23)
CALCIUM SERPL-MCNC: 8.9 MG/DL (ref 8.8–10.2)
CHLORIDE SERPL-SCNC: 99 MMOL/L (ref 98–107)
CO2 SERPL-SCNC: 21 MMOL/L (ref 22–29)
CREAT SERPL-MCNC: 0.8 MG/DL (ref 0.5–1)
ERYTHROCYTE [DISTWIDTH] IN BLOOD BY AUTOMATED COUNT: 13.1 % (ref 11.5–15)
GFR, ESTIMATED: 75 ML/MIN/1.73M2
GLUCOSE SERPL-MCNC: 143 MG/DL (ref 74–99)
HCT VFR BLD AUTO: 36.5 % (ref 34–48)
HGB BLD-MCNC: 11.3 G/DL (ref 11.5–15.5)
MCH RBC QN AUTO: 30.6 PG (ref 26–35)
MCHC RBC AUTO-ENTMCNC: 31 G/DL (ref 32–34.5)
MCV RBC AUTO: 98.9 FL (ref 80–99.9)
PLATELET # BLD AUTO: 220 K/UL (ref 130–450)
PMV BLD AUTO: 11.9 FL (ref 7–12)
POTASSIUM SERPL-SCNC: 5 MMOL/L (ref 3.5–5.1)
RBC # BLD AUTO: 3.69 M/UL (ref 3.5–5.5)
SODIUM SERPL-SCNC: 134 MMOL/L (ref 136–145)
WBC OTHER # BLD: 18.2 K/UL (ref 4.5–11.5)

## 2025-08-14 PROCEDURE — 80048 BASIC METABOLIC PNL TOTAL CA: CPT

## 2025-08-14 PROCEDURE — 85027 COMPLETE CBC AUTOMATED: CPT

## 2025-08-15 ENCOUNTER — HOSPITAL ENCOUNTER (OUTPATIENT)
Age: 75
Discharge: HOME | End: 2025-08-17

## 2025-08-15 LAB
ANION GAP SERPL CALCULATED.3IONS-SCNC: 14 MMOL/L (ref 7–16)
BUN SERPL-MCNC: 22 MG/DL (ref 8–23)
CALCIUM SERPL-MCNC: 8.7 MG/DL (ref 8.8–10.2)
CHLORIDE SERPL-SCNC: 112 MMOL/L (ref 98–107)
CO2 SERPL-SCNC: 23 MMOL/L (ref 22–29)
CREAT SERPL-MCNC: 1 MG/DL (ref 0.5–1)
ERYTHROCYTE [DISTWIDTH] IN BLOOD BY AUTOMATED COUNT: 13.6 % (ref 11.5–15)
GFR, ESTIMATED: 61 ML/MIN/1.73M2
GLUCOSE SERPL-MCNC: 90 MG/DL (ref 74–99)
HCT VFR BLD AUTO: 32.9 % (ref 34–48)
HGB BLD-MCNC: 10.6 G/DL (ref 11.5–15.5)
MCH RBC QN AUTO: 31.3 PG (ref 26–35)
MCHC RBC AUTO-ENTMCNC: 32.2 G/DL (ref 32–34.5)
MCV RBC AUTO: 97.1 FL (ref 80–99.9)
PLATELET # BLD AUTO: 198 K/UL (ref 130–450)
PMV BLD AUTO: 11.8 FL (ref 7–12)
POTASSIUM SERPL-SCNC: 4.9 MMOL/L (ref 3.5–5.1)
RBC # BLD AUTO: 3.39 M/UL (ref 3.5–5.5)
SODIUM SERPL-SCNC: 149 MMOL/L (ref 136–145)
WBC OTHER # BLD: 11.2 K/UL (ref 4.5–11.5)

## 2025-08-15 PROCEDURE — 85027 COMPLETE CBC AUTOMATED: CPT

## 2025-08-15 PROCEDURE — 80048 BASIC METABOLIC PNL TOTAL CA: CPT

## 2025-08-18 LAB — SURGICAL PATHOLOGY REPORT: NORMAL
